# Patient Record
(demographics unavailable — no encounter records)

---

## 2017-02-06 NOTE — DIAGNOSTIC IMAGING REPORT
RIGHT UPPER EXTREMITY ULTRASOUND



CLINICAL HISTORY: Deltoid mass.    



COMPARISON STUDY:  Chest CT April 4, 2016.



FINDINGS: No mass or fluid collection was identified within the region the right

deltoid muscle by sonography. Echogenicity of the tissues was slightly increased

compared to the left upper extremity.



IMPRESSION:  



1. No mass or fluid collection identified within the right upper extremity by

sonography.



2. Nonspecific increased echogenicity of the subcutaneous tissues of the right

upper arm when compared to left. This could reflect edema or contusion. 









Electronically signed by:  Gabino Montiel M.D.

2/6/2017 3:19 PM



Dictated Date/Time:  2/6/2017 3:17 PM

## 2017-03-20 NOTE — CODING QUERY MEDICAL NECESSITY
CQSUPPORTING DIAGNOSIS NEEDED





A supporting diagnosis is required for the test/procedure performed on this patient in 
order for us to be reimbursed by the patient's insurance. Please provide a supporting 
diagnosis for the following test/procedure listed below next to the test name along with 
your signature. 



*If there is no additional diagnosis for this patient that would support the following 
test/procedure please document that below next to the test/procedure.



Test(s)/Procedure(s) that require a supporting diagnosis:





DOS    03/10/17         VITAMIN B12 TEST        ORDERED BY MARISABEL CARREON







Provider Signature:  ______________________________  Date:  _______



Thank you  

Laura Lr

Health Information Management

Phone:  [phone]

Fax:  [phone]



Once completed, please kindly fax back to [phone]



For questions please call [phone]

## 2017-05-05 NOTE — DIAGNOSTIC IMAGING REPORT
RIGHT SHOULDER MIN 2 VIEWS ROUTINE



CLINICAL HISTORY: FELL AT HOME, RIGHT SHOULDER PAIN

Right trauma. Pain.



COMPARISON: None.



DISCUSSION: Grade 1 separation right acromioclavicular joint. Degenerative

change right glenohumeral joint. No well-defined evidence for fracture.

Osteopenia. There is no evidence for soft tissue swelling.



IMPRESSION: 

1. Grade 1 separation right acromioclavicular joint.





2. Degenerative change glenohumeral joint with evidence for underlying

osteopenia.







Electronically signed by:  Seth Nichole M.D.

5/5/2017 3:42 PM



Dictated Date/Time:  5/5/2017 3:41 PM

## 2017-05-05 NOTE — DIAGNOSTIC IMAGING REPORT
RIGHT HIP 2 VIEWS



CLINICAL HISTORY: Right hip pain. Fall.



FINDINGS: AP and frog-leg views of the right hip are compared to study dated

3/8/2016. The skeletal structures are osteopenic. No fracture is identified in

the right hip or the visualized right hemipelvis. Mild arthritic change and

joint space narrowing are noted in the right hip. The overlying Soft tissues are

within normal limits. Brachytherapy beads are noted in the prostate gland.



IMPRESSION: There is no radiographic evidence of right hip fracture.







Electronically signed by:  Elfego Ruff M.D.

5/5/2017 3:43 PM



Dictated Date/Time:  5/5/2017 3:42 PM

## 2017-11-18 NOTE — DIAGNOSTIC IMAGING REPORT
HEAD CT NONCONTRAST



CT DOSE: 614.27 mGy.cm



HISTORY: Fall. Head injury.  [facility]



TECHNIQUE: Multiaxial CT images of the head were performed without the use of

intravenous contrast. Automated exposure control was utilized for this study.  A

dose lowering technique was utilized adhering to the principles of ALARA.



Comparison: Head CT 4/4/2016.



Findings: Small amount of bubbly secretions within the right maxillary sinus. A

few partially opacified ethmoid air cells. The mastoid air cells are clear. The

calvarium and skull base are intact. There is no mass, hematoma, midline shift,

acute infarct. White matter hypodensity is nonspecific but suggestive of

microvascular ischemic change. The ventricles and sulci demonstrate mild

age-related involutional changes. Punctate density within the left MCA territory

infarct on image 20 likely represents a calcification. Old large MCA territory

infarct. Small old lacunar infarct within the left basal ganglia. Small

arachnoid cyst within the left middle cranial fossa, unchanged.



Impression:

No significant change compared to the prior study. No acute intracranial

abnormality. Old left MCA territory infarct. 







Electronically signed by:  Skyler Crain M.D.

11/18/2017 10:40 AM



Dictated Date/Time:  11/18/2017 10:34 AM

## 2017-11-18 NOTE — EMERGENCY ROOM VISIT NOTE
ED Visit Note


EMERGENCY DEPARTMENT PROCEDURE NOTE:





I was asked by Dr. Cedeño to repair the right ear laceration of this 81-year

-old white male patient.  Please refer to their dictation for the complete 

history, physical exam, and ED course.








EMERGENCY DEPARTMENT COURSE: The wound was cleansed with saline thoroughly, 

then prepped with chlorhexidine and draped with sterile towels.  The wound was 

anesthetized with 1% plain buffered lidocaine.  The 3 cm laceration was 

irrigated copiously using normal saline solution and direct pressure 

irrigation.  The wound was repaired using 6-0 nylon sutures.  Bacitracin was 

applied.  Patient tolerated the procedure well.

## 2017-11-18 NOTE — EMERGENCY ROOM VISIT NOTE
History


Report prepared by Tristian:  Marco Antonio Andrew


Under the Supervision of:  Dr. Diane Cedeño M.D.


First contact with patient:  09:37


Chief Complaint:  LACERATION/CUT (SUT/DERMABOND)


Stated Complaint:  LACERATION FROM FALL


Nursing Triage Summary:  


see triage note





History of Present Illness


The patient is a 81 year old male who presents to the Emergency Room with 

complaints of constant right ear pain s/p fall occurring just prior to arrival. 

He states that he woke up and opened his curtains to look at the stars when he 

tripped over a trunk. He fell onto the right side of his head, and is unsure if 

he landed on something. The patient did not lose consciousness. He is on Plavix 

and baby aspirin. He denies any neck pain. The patient was able to get himself 

up after falling. He believes that his tetanus is up to date.





   Source of History:  patient


   Onset:  Just prior to arrival


   Position:  ear (right)


   Timing:  constant


   Associated Symptoms:  No LOC, No neck pain





Review of Systems


See HPI for pertinent positives & negatives. A total of 10 systems reviewed and 

were otherwise negative.





Past Medical & Surgical


Medical Problems:


(1) Carotid stenosis


(2) Contusion of multiple sites


(3) Fall


(4) Stroke


(5) Stroke


(6) Syncope


(7) Weakness following cerebrovascular accident (CVA)








Family History





Patient reports no known family medical history.





Social History


Smoking Status:  Never Smoker


Alcohol Use:  none


Drug Use:  none


Marital Status:  


Housing Status:  lives with significant other


Occupation Status:  retired





Current/Historical Medications


Scheduled


Aspirin (Aspirin Ec), 81 MG PO DAILY


Cholecalciferol (Vitamin D-3), 1,000 UNITS PO DAILY


Clopidogrel (Plavix), 75 MG PO QAM


Duloxetine HCl (Duloxetine HCl), 90 MG PO DAILY


Gabapentin (Gabapentin), 100 MG PO TID


Lisinopril (Zestril), 10 MG PO DAILY


Methocarbamol (Methocarbamol), 500 MG PO BID


Pravastatin Sodium (Pravastatin Sodium), 10 MG PO DAILY


Pregabalin (Lyrica), 50 MG PO BID





Allergies


Coded Allergies:  


     No Known Allergies (Unverified , 11/18/17)





Physical Exam


Vital Signs











  Date Time  Temp Pulse Resp B/P (MAP) Pulse Ox O2 Delivery O2 Flow Rate FiO2


 


11/18/17 13:28  60 18 172/81 97   


 


11/18/17 11:25  56 18 165/84 98 Room Air  


 


11/18/17 09:16 36.7 57 18 195/103 95 Room Air  











Physical Exam


Vital signs reviewed.





General: Elderly-appearing male, in no significant distress. Chronically 

debilitated appearing. 





HEENT: No scleral icterus, PERRLA, neck supple. Through and through 3 cm 

laceration to the right ear lobe. Dressing over right ear.





Cardiovascular: Regular rate and rhythm, no extra sounds.





Pulmonary: Clear to auscultation bilaterally, normal work of breathing.





Abdomen: Soft, nontender, nondistended, positive bowel sounds.





Musculoskeletal: No peripheral edema. No cervical spine tenderness. 2 cm skin 

tear to the right elbow. 





Neurologic: Patient awake alert and oriented x 3.





Skin: Warm, dry, no rash





Medical Decision & Procedures


ER Provider


Diagnostic Interpretation:


Radiology results as stated below per my review and radiologist interpretation:





HEAD CT NONCONTRAST





Findings: Small amount of bubbly secretions within the right maxillary sinus. A


few partially opacified ethmoid air cells. The mastoid air cells are clear. The


calvarium and skull base are intact. There is no mass, hematoma, midline shift,


acute infarct. White matter hypodensity is nonspecific but suggestive of


microvascular ischemic change. The ventricles and sulci demonstrate mild


age-related involutional changes. Punctate density within the left MCA territory


infarct on image 20 likely represents a calcification. Old large MCA territory


infarct. Small old lacunar infarct within the left basal ganglia. Small


arachnoid cyst within the left middle cranial fossa, unchanged.





Impression:


No significant change compared to the prior study. No acute intracranial


abnormality. Old left MCA territory infarct. 





Electronically signed by:  Skyler Crain M.D.


11/18/2017 10:40 AM





Medications Administered











 Medications


  (Trade)  Dose


 Ordered  Sig/Mehul


 Route  Start Time


 Stop Time Status Last Admin


Dose Admin


 


 Diphtheria/


 Pertussis/Tetanus


 Vacc


  (Adacel Inj)  0.5 ml  ONCE ONCE


 IM.  11/18/17 10:00


 11/18/17 10:01 DC 11/18/17 11:31


0.5 ML











ED Course


0940: Past medical records reviewed. The patient was evaluated in room B12B. A 

complete history and physical examination was performed. 





1000: Ordered Adacel Inj 0.5 mL IM. 





1251: Upon reevaluation, the patient appeared to have improvement of his 

symptoms. I discussed findings with him. He verbalized agreement of the 

treatment plan. The patient was discharged home.





Medical Decision


Differential diagnosis:


Intracranial injury, cervical spine injury, intrathoracic injury, intra-

abdominal injury, musculoskeletal injury.





This patient was evaluated and appeared to be in no significant distress.  

Patient's right earlobe was evaluated and shows a significant through and 

through laceration.  Patient's tetanus status was updated.  The wound was 

repaired by Vanessa Morelos PA-C.  Please see her notes for further details.  CT 

scan of the head was performed and is negative.  Patient was given wound care 

instructions in the presence of his wife.  They will follow-up with the PCP for 

suture removal.  He'll return to the ER for worsening of symptoms or any 

medical concerns.





Medication Reconcilliation


Current Medication List:  was personally reviewed by me





Blood Pressure Screening


Patient's blood pressure:  Elevated blood pressure


Blood pressure disposition:  Referred to PCP





Impression





 Primary Impression:  


 Laceration of right ear


 Additional Impressions:  


 Fall


 Skin tear of right elbow without complication





Scribe Attestation


The scribe's documentation has been prepared under my direction and personally 

reviewed by me in its entirety. I confirm that the note above accurately 

reflects all work, treatment, procedures, and medical decision making performed 

by me.





Departure Information


Dispostion


Home / Self-Care





Referrals


Binu Jaramillo M.D. (PCP)





Forms


HOME CARE DOCUMENTATION FORM,                                                 

               IMPORTANT VISIT INFORMATION





Patient Instructions


My [facility]





Additional Instructions





Diagnosis: Laceration of the right earlobe, fall, right elbow skin tear





Wash wounds with warm water and soap 1-2 times daily as needed.





Cover with a dry dressing.





Sutures removed by your PCP in 7-10 days.





You were given an Adacel injection today.





Return to the emergency department for worsening of symptoms or any medical 

concerns.





Problem Qualifiers

## 2018-01-18 NOTE — DIAGNOSTIC IMAGING REPORT
R SHOULDER MIN 2 VIEWS



CLINICAL HISTORY: RIGHT SHOULDER PAIN pain



COMPARISON: 5 2017



DISCUSSION: Moderate generalized degenerative change of the right shoulder

including glenohumeral and acromioclavicular joints. Subacute to old fractures

right sixth and eighth ribs. Right pulmonary apex is clear. There is no evidence

for soft tissue swelling.



IMPRESSION: 

1. Degenerative change right shoulder and acromioclavicular joint..

2. No acute process specifically of the right shoulder.

3. Subacute to old fractures right sixth and eighth ribs.











The above report was generated using voice recognition software.  It may contain

grammatical, syntax or spelling errors.







Electronically signed by:  Seth Nichole M.D.

1/18/2018 3:16 PM



Dictated Date/Time:  1/18/2018 3:14 PM